# Patient Record
Sex: FEMALE | Race: WHITE | NOT HISPANIC OR LATINO | Employment: FULL TIME | URBAN - NONMETROPOLITAN AREA
[De-identification: names, ages, dates, MRNs, and addresses within clinical notes are randomized per-mention and may not be internally consistent; named-entity substitution may affect disease eponyms.]

---

## 2024-03-15 ENCOUNTER — OFFICE VISIT (OUTPATIENT)
Dept: PRIMARY CARE | Facility: CLINIC | Age: 24
End: 2024-03-15
Payer: COMMERCIAL

## 2024-03-15 VITALS
HEART RATE: 94 BPM | DIASTOLIC BLOOD PRESSURE: 74 MMHG | TEMPERATURE: 98.4 F | WEIGHT: 132.9 LBS | RESPIRATION RATE: 12 BRPM | SYSTOLIC BLOOD PRESSURE: 114 MMHG | OXYGEN SATURATION: 96 %

## 2024-03-15 DIAGNOSIS — R11.2 NAUSEA AND VOMITING, UNSPECIFIED VOMITING TYPE: Primary | ICD-10-CM

## 2024-03-15 PROBLEM — D22.4 NEVUS OF SCALP: Status: ACTIVE | Noted: 2024-03-15

## 2024-03-15 PROBLEM — R09.81 NASAL CONGESTION: Status: ACTIVE | Noted: 2024-03-15

## 2024-03-15 PROBLEM — R11.0 NAUSEA IN ADULT: Status: ACTIVE | Noted: 2024-03-15

## 2024-03-15 PROBLEM — J01.90 SINUSITIS, ACUTE: Status: ACTIVE | Noted: 2024-03-15

## 2024-03-15 PROBLEM — J20.9 BRONCHITIS, ACUTE: Status: ACTIVE | Noted: 2024-03-15

## 2024-03-15 PROBLEM — J02.9 SORE THROAT: Status: ACTIVE | Noted: 2024-03-15

## 2024-03-15 PROCEDURE — 1036F TOBACCO NON-USER: CPT | Performed by: FAMILY MEDICINE

## 2024-03-15 PROCEDURE — 99213 OFFICE O/P EST LOW 20 MIN: CPT | Performed by: FAMILY MEDICINE

## 2024-03-15 RX ORDER — OMEPRAZOLE 40 MG/1
40 CAPSULE, DELAYED RELEASE ORAL
Qty: 30 CAPSULE | Refills: 0 | Status: SHIPPED | OUTPATIENT
Start: 2024-03-15 | End: 2024-04-14

## 2024-03-15 RX ORDER — ONDANSETRON 4 MG/1
4 TABLET, FILM COATED ORAL DAILY PRN
Qty: 7 TABLET | Refills: 0 | Status: SHIPPED | OUTPATIENT
Start: 2024-03-15 | End: 2024-03-22

## 2024-03-15 NOTE — PROGRESS NOTES
Subjective   Patient ID: Carmelita Durant is a 24 y.o. female who presents for Vomiting (Intermittent , not related to stress/ anxiety x3 weeks /).    HPI   Has had nausea/vomiting in the past, but not this frequent  She has been hydrating and eating well  Occurs in the morning between 4am-5:45am, wakes up feeling nauseated, has mild abdominal pain. This wakes her up out of sleep. Does not vomit every morning. Happens every other day. Vomits once and still feels somewhat ill but fades. She is fine the rest of the day. Vomits only bile and sinus drainage.  Denies pregnancy  Denies acid reflux or acid brash  Denies marijuana use  +stress  +anxiety  Avoid caffeine  Feels better when drinking water  Denies migraines  No constipation or diarrhea  Still has her gallbladder  No trigger  Had a cold a few weeks ago  Never taken zofran or phenergan  Taking zinc daily     Review of Systems  See HPI    Objective   /74 (BP Location: Left arm, Patient Position: Sitting, BP Cuff Size: Adult)   Pulse 94   Temp 36.9 °C (98.4 °F) (Temporal)   Resp 12   Wt 60.3 kg (132 lb 14.4 oz)   LMP 03/14/2024   SpO2 96%     Physical Exam  Constitutional: Well developed, well nourished, alert and in no acute distress   Eyes: Normal external exam.   Neck: Supple, no lymphadenopathy or masses.   Cardiovascular: Regular rate and rhythm, normal S1 and S2, no murmurs, gallops, or rubs. Radial pulses normal. No peripheral edema.  Pulmonary: No respiratory distress, lungs clear to auscultation bilaterally. No wheezes, rhonchi, rales.  Abdomen: +BS, soft, NT, ND  Skin: Warm, well perfused, normal skin turgor and color.   Neurologic: Cranial nerves II-XII grossly intact.   Psychiatric: Mood calm and affect normal.    Assessment/Plan   Stop daily zinc supplement, if not improving within 5-7 days then please start prescription Omeprazole prior to bed time (try not to eat 2-4 hours before taking medication).     Please contact me if still no  improvement after trialing omeprazole x 2 weeks